# Patient Record
Sex: MALE | Race: WHITE | NOT HISPANIC OR LATINO | Employment: FULL TIME | URBAN - METROPOLITAN AREA
[De-identification: names, ages, dates, MRNs, and addresses within clinical notes are randomized per-mention and may not be internally consistent; named-entity substitution may affect disease eponyms.]

---

## 2018-01-11 NOTE — RESULT NOTES
Verified Results  * MRI SHOULDER RIGHT WO CONTRAST [id number] 07:09AM Aleyda Pa Order Number: [id number]   Performing Comments: CD copy to patient please   - Patient Instructions: To schedule this appointment, please contact Central Scheduling at [phone]  Test Name Result Flag Reference   MRI SHOULDER RIGHT WO CONTRAST (Report)     This is a summary report  The complete report is available in the patient's medical record  If you cannot access the medical record, please contact the sending organization for a detailed fax or copy  MRI RIGHT SHOULDER     INDICATION: Shoulder pain  COMPARISON: None  TECHNIQUE:  The following MR sequences were obtained of the right shoulder: Localizer, axial GRE/PD fat sat, oblique coronal T2 fat sat, oblique sagittal T1/T2 fat sat  Images were acquired on a 1 5 Rosario unit  Gadolinium was not used  FINDINGS:     SUBCUTANEOUS TISSUES: Normal     JOINT EFFUSION: None  ACROMION PROCESS: There is a moderate-sized subacromial spur  ROTATOR CUFF: Supraspinatus and infraspinatus insertional tendinosis without evidence of full-thickness component rotator cuff tear  The remaining muscles and tendons of the rotator cuff appear intact without muscle atrophy or fatty infiltration  SUBACROMIAL/SUBDELTOID BURSA: There is a small subacromial/subdeltoid bursitis  LONG HEAD OF BICEPS TENDON: Normal      GLENOID LABRUM: Intact  GLENOHUMERAL JOINT: Intact  ACROMIOCLAVICULAR JOINT: There is moderate osteoarthritis  BONE MARROW SIGNAL: There is subchondral edema with geodes at the rotator cuff attachment on the humeral head  IMPRESSION:       1  Rotator cuff tendinosis with no full-thickness tear  2  Moderate-sized subacromial osteophyte with acromioclavicular joint osteoarthritis         Workstation performed: [id number]     Signed by:   Yanet Braden MD   4/18/16       Discussion/Summary   MRI of the shoulder showed rotator cuff tendonitis and bursitis and arthritis    Dr Edgar Andrew

## 2021-04-08 DIAGNOSIS — Z23 ENCOUNTER FOR IMMUNIZATION: ICD-10-CM

## 2021-04-25 PROBLEM — Z12.11 ENCOUNTER FOR SCREENING FOR MALIGNANT NEOPLASM OF COLON: Status: ACTIVE | Noted: 2019-05-16

## 2021-04-29 ENCOUNTER — OFFICE VISIT (OUTPATIENT)
Dept: FAMILY MEDICINE CLINIC | Facility: CLINIC | Age: 61
End: 2021-04-29
Payer: COMMERCIAL

## 2021-04-29 VITALS
OXYGEN SATURATION: 99 % | WEIGHT: 172 LBS | HEIGHT: 71 IN | DIASTOLIC BLOOD PRESSURE: 70 MMHG | SYSTOLIC BLOOD PRESSURE: 114 MMHG | RESPIRATION RATE: 16 BRPM | TEMPERATURE: 97.4 F | BODY MASS INDEX: 24.08 KG/M2 | HEART RATE: 67 BPM

## 2021-04-29 DIAGNOSIS — Z86.010 PERSONAL HISTORY OF COLONIC POLYPS: ICD-10-CM

## 2021-04-29 DIAGNOSIS — Z12.5 PROSTATE CANCER SCREENING: ICD-10-CM

## 2021-04-29 DIAGNOSIS — Z00.00 HEALTHCARE MAINTENANCE: Primary | ICD-10-CM

## 2021-04-29 DIAGNOSIS — Z11.59 NEED FOR HEPATITIS C SCREENING TEST: ICD-10-CM

## 2021-04-29 DIAGNOSIS — Z12.11 COLON CANCER SCREENING: ICD-10-CM

## 2021-04-29 DIAGNOSIS — Z13.1 SCREENING FOR DIABETES MELLITUS (DM): ICD-10-CM

## 2021-04-29 DIAGNOSIS — Z13.89 SCREENING FOR CARDIOVASCULAR, RESPIRATORY, AND GENITOURINARY DISEASES: ICD-10-CM

## 2021-04-29 DIAGNOSIS — Z13.83 SCREENING FOR CARDIOVASCULAR, RESPIRATORY, AND GENITOURINARY DISEASES: ICD-10-CM

## 2021-04-29 DIAGNOSIS — M67.432 GANGLION CYST OF WRIST, LEFT: ICD-10-CM

## 2021-04-29 DIAGNOSIS — Z13.6 SCREENING FOR CARDIOVASCULAR, RESPIRATORY, AND GENITOURINARY DISEASES: ICD-10-CM

## 2021-04-29 PROBLEM — Z86.0100 PERSONAL HISTORY OF COLONIC POLYPS: Status: ACTIVE | Noted: 2021-04-29

## 2021-04-29 PROCEDURE — 3008F BODY MASS INDEX DOCD: CPT | Performed by: FAMILY MEDICINE

## 2021-04-29 PROCEDURE — 1036F TOBACCO NON-USER: CPT | Performed by: FAMILY MEDICINE

## 2021-04-29 PROCEDURE — 3725F SCREEN DEPRESSION PERFORMED: CPT | Performed by: FAMILY MEDICINE

## 2021-04-29 PROCEDURE — 99386 PREV VISIT NEW AGE 40-64: CPT | Performed by: FAMILY MEDICINE

## 2021-04-29 NOTE — PROGRESS NOTES
Assessment/Plan:    No problem-specific Assessment & Plan notes found for this encounter  cpe  Update labs  Yearly suggested if normal  Left ganglion cyst, refer to Dr Agustin Cadet     Diagnoses and all orders for this visit:    Healthcare maintenance    Personal history of colonic polyps  -     Ambulatory referral to Gastroenterology; Future    Screening for cardiovascular, respiratory, and genitourinary diseases  -     Lipid Panel with Direct LDL reflex; Future    Screening for diabetes mellitus (DM)  -     Comprehensive metabolic panel; Future    Prostate cancer screening  -     PSA, Total Screen; Future    Need for hepatitis C screening test    Colon cancer screening  -     Ambulatory referral to Gastroenterology; Future    Ganglion cyst of wrist, left  -     Ambulatory referral to Orthopedic Surgery; Future         Return if symptoms worsen or fail to improve  Subjective:      Patient ID: Lizabeth Hartman is a 63 y o  male  Chief Complaint   Patient presents with    Physical Exam     mz cma       HPI  Moved to [facility] for 2y then back to [facility] then [facility] now    Was seeing PCP in [address] [place] [address]  Over 1-2y  No rx meds    Walks a lot  Not much exercise lately, gym before pandemic  Diet healthy  Fruits/veggies  Can drink more better    Colonoscopy 3y ago, 3 polyps,     Had tetanus in past 10y    Had covid series    Last tob 20y    Enlarging past 1y, lexis past month  Left wrist cyst  No trigger finger    The following portions of the patient's history were reviewed and updated as appropriate: allergies, current medications, past family history, past medical history, past social history, past surgical history and problem list     Review of Systems   Constitutional: Negative for fever  Respiratory: Negative for shortness of breath  No current outpatient medications on file  No current facility-administered medications for this visit          Objective:    /70 Pulse 67   Temp (!) 97 4 °F (36 3 °C)   Resp 16   Ht 5' 11" (1 803 m)   Wt 78 kg (172 lb)   SpO2 99%   BMI 23 99 kg/m²        Physical Exam  Vitals signs and nursing note reviewed  Constitutional:       Appearance: He is well-developed  He is not ill-appearing  HENT:      Head: Normocephalic  Right Ear: Tympanic membrane normal       Left Ear: Tympanic membrane normal    Eyes:      General: No scleral icterus  Conjunctiva/sclera: Conjunctivae normal    Neck:      Musculoskeletal: Neck supple  Vascular: No carotid bruit  Cardiovascular:      Rate and Rhythm: Normal rate and regular rhythm  Heart sounds: No murmur  Pulmonary:      Effort: Pulmonary effort is normal  No respiratory distress  Breath sounds: No wheezing or rales  Abdominal:      General: There is no distension  Palpations: Abdomen is soft  Tenderness: There is no abdominal tenderness  Hernia: No hernia is present  Genitourinary:     Penis: Normal        Scrotum/Testes: Normal       Prostate: Normal       Rectum: Normal    Musculoskeletal:         General: No deformity  Right lower leg: No edema  Left lower leg: No edema  Skin:     General: Skin is warm and dry  Coloration: Skin is not jaundiced or pale  Neurological:      Mental Status: He is alert  Motor: No weakness  Gait: Gait normal    Psychiatric:         Mood and Affect: Mood normal          Behavior: Behavior normal          Thought Content: Thought content normal        BMI Counseling: Body mass index is 23 99 kg/m²  The BMI is above normal  Nutrition recommendations include decreasing portion sizes and moderation in carbohydrate intake  Exercise recommendations include exercising 3-5 times per week  No pharmacotherapy was ordered                  Arlene Stephens DO

## 2021-05-01 LAB
ALBUMIN SERPL-MCNC: 4.1 G/DL (ref 3.6–5.1)
ALBUMIN/GLOB SERPL: 1.7 (CALC) (ref 1–2.5)
ALP SERPL-CCNC: 67 U/L (ref 35–144)
ALT SERPL-CCNC: 13 U/L (ref 9–46)
AST SERPL-CCNC: 18 U/L (ref 10–35)
BILIRUB SERPL-MCNC: 0.5 MG/DL (ref 0.2–1.2)
BUN SERPL-MCNC: 15 MG/DL (ref 7–25)
BUN/CREAT SERPL: NORMAL (CALC) (ref 6–22)
CALCIUM SERPL-MCNC: 9.2 MG/DL (ref 8.6–10.3)
CHLORIDE SERPL-SCNC: 104 MMOL/L (ref 98–110)
CHOLEST SERPL-MCNC: 178 MG/DL
CHOLEST/HDLC SERPL: 2.1 (CALC)
CO2 SERPL-SCNC: 29 MMOL/L (ref 20–32)
CREAT SERPL-MCNC: 0.8 MG/DL (ref 0.7–1.25)
GLOBULIN SER CALC-MCNC: 2.4 G/DL (CALC) (ref 1.9–3.7)
GLUCOSE SERPL-MCNC: 91 MG/DL (ref 65–99)
HDLC SERPL-MCNC: 83 MG/DL
LDLC SERPL CALC-MCNC: 82 MG/DL (CALC)
NONHDLC SERPL-MCNC: 95 MG/DL (CALC)
POTASSIUM SERPL-SCNC: 4.7 MMOL/L (ref 3.5–5.3)
PROT SERPL-MCNC: 6.5 G/DL (ref 6.1–8.1)
PSA SERPL-MCNC: 0.9 NG/ML
SL AMB EGFR AFRICAN AMERICAN: 112 ML/MIN/1.73M2
SL AMB EGFR NON AFRICAN AMERICAN: 96 ML/MIN/1.73M2
SODIUM SERPL-SCNC: 140 MMOL/L (ref 135–146)
TRIGL SERPL-MCNC: 53 MG/DL

## 2021-05-17 ENCOUNTER — TELEPHONE (OUTPATIENT)
Dept: GASTROENTEROLOGY | Facility: CLINIC | Age: 61
End: 2021-05-17

## 2021-05-17 ENCOUNTER — PREP FOR PROCEDURE (OUTPATIENT)
Dept: GASTROENTEROLOGY | Facility: CLINIC | Age: 61
End: 2021-05-17

## 2021-05-17 DIAGNOSIS — Z86.010 HISTORY OF COLON POLYPS: Primary | ICD-10-CM

## 2021-05-17 NOTE — TELEPHONE ENCOUNTER
Received message from pt informing he was referred by his pcp to schedule colon screening, however, pt is overdue for recall colon for hx of polyps with Dr Veronica Mederos  I returned pt's call, lmom apologizing for missing him and to please call our office back to schedule a procedure with Dr Veronica Mederos  Will call pt again in one week  There is a referral in pt's chart from Dr Gloria Rios

## 2021-06-11 LAB — EXT SARS-COV-2: NOT DETECTED

## 2021-06-16 ENCOUNTER — ANESTHESIA EVENT (OUTPATIENT)
Dept: GASTROENTEROLOGY | Facility: AMBULATORY SURGERY CENTER | Age: 61
End: 2021-06-16

## 2021-06-16 ENCOUNTER — ANESTHESIA (OUTPATIENT)
Dept: GASTROENTEROLOGY | Facility: AMBULATORY SURGERY CENTER | Age: 61
End: 2021-06-16

## 2021-06-16 ENCOUNTER — HOSPITAL ENCOUNTER (OUTPATIENT)
Dept: GASTROENTEROLOGY | Facility: AMBULATORY SURGERY CENTER | Age: 61
Discharge: HOME | End: 2021-06-16
Payer: COMMERCIAL

## 2021-06-16 VITALS
HEIGHT: 72 IN | DIASTOLIC BLOOD PRESSURE: 75 MMHG | SYSTOLIC BLOOD PRESSURE: 101 MMHG | OXYGEN SATURATION: 99 % | RESPIRATION RATE: 18 BRPM | HEART RATE: 56 BPM | BODY MASS INDEX: 23.03 KG/M2 | TEMPERATURE: 97.2 F | WEIGHT: 170 LBS

## 2021-06-16 DIAGNOSIS — Z86.010 HISTORY OF COLON POLYPS: ICD-10-CM

## 2021-06-16 PROCEDURE — 88305 TISSUE EXAM BY PATHOLOGIST: CPT | Performed by: PATHOLOGY

## 2021-06-16 PROCEDURE — 45380 COLONOSCOPY AND BIOPSY: CPT | Performed by: INTERNAL MEDICINE

## 2021-06-16 PROCEDURE — 00811 ANES LWR INTST NDSC NOS: CPT | Performed by: NURSE ANESTHETIST, CERTIFIED REGISTERED

## 2021-06-16 RX ORDER — SODIUM CHLORIDE 9 MG/ML
20 INJECTION, SOLUTION INTRAVENOUS CONTINUOUS
Status: DISCONTINUED | OUTPATIENT
Start: 2021-06-16 | End: 2021-06-20 | Stop reason: HOSPADM

## 2021-06-16 RX ORDER — PROPOFOL 10 MG/ML
INJECTION, EMULSION INTRAVENOUS AS NEEDED
Status: DISCONTINUED | OUTPATIENT
Start: 2021-06-16 | End: 2021-06-16

## 2021-06-16 RX ORDER — SODIUM CHLORIDE 9 MG/ML
INJECTION, SOLUTION INTRAVENOUS CONTINUOUS PRN
Status: DISCONTINUED | OUTPATIENT
Start: 2021-06-16 | End: 2021-06-16

## 2021-06-16 RX ORDER — SODIUM CHLORIDE 9 MG/ML
30 INJECTION, SOLUTION INTRAVENOUS CONTINUOUS
Status: DISCONTINUED | OUTPATIENT
Start: 2021-06-16 | End: 2021-06-20 | Stop reason: HOSPADM

## 2021-06-16 RX ORDER — SODIUM CHLORIDE 9 MG/ML
50 INJECTION, SOLUTION INTRAVENOUS CONTINUOUS
Status: DISCONTINUED | OUTPATIENT
Start: 2021-06-16 | End: 2021-06-20 | Stop reason: HOSPADM

## 2021-06-16 RX ADMIN — PROPOFOL 20 MG: 10 INJECTION, EMULSION INTRAVENOUS at 10:56

## 2021-06-16 RX ADMIN — PROPOFOL 20 MG: 10 INJECTION, EMULSION INTRAVENOUS at 11:04

## 2021-06-16 RX ADMIN — SODIUM CHLORIDE: 9 INJECTION, SOLUTION INTRAVENOUS at 10:50

## 2021-06-16 RX ADMIN — PROPOFOL 20 MG: 10 INJECTION, EMULSION INTRAVENOUS at 11:01

## 2021-06-16 RX ADMIN — PROPOFOL 30 MG: 10 INJECTION, EMULSION INTRAVENOUS at 10:54

## 2021-06-16 RX ADMIN — PROPOFOL 100 MG: 10 INJECTION, EMULSION INTRAVENOUS at 10:52

## 2021-06-16 RX ADMIN — PROPOFOL 20 MG: 10 INJECTION, EMULSION INTRAVENOUS at 10:58

## 2021-06-16 RX ADMIN — PROPOFOL 20 MG: 10 INJECTION, EMULSION INTRAVENOUS at 11:07

## 2021-06-16 RX ADMIN — PROPOFOL 20 MG: 10 INJECTION, EMULSION INTRAVENOUS at 11:10

## 2021-06-16 NOTE — ANESTHESIA POSTPROCEDURE EVALUATION
Post-Op Assessment Note    CV Status:  Stable  Pain Score: 0    Pain management: adequate     Mental Status:  Alert and awake   Hydration Status:  Euvolemic   PONV Controlled:  Controlled   Airway Patency:  Patent      Post Op Vitals Reviewed: Yes      Staff: CRNA         No complications documented      BP   103/66   Temp     Pulse 54   Resp   18   SpO2   99%

## 2021-06-16 NOTE — DISCHARGE INSTRUCTIONS
Colonoscopy   WHAT YOU NEED TO KNOW:   A colonoscopy is a procedure to examine the inside of your colon (intestine) with a scope  Polyps or tissue growths may have been removed during your colonoscopy  It is normal to feel bloated and to have some abdominal discomfort  You should be passing gas  If you have hemorrhoids or you had polyps removed, you may have a small amount of bleeding  DISCHARGE INSTRUCTIONS:   Seek care immediately if:    You have sudden, severe abdominal pain   You have problems swallowing   You have a large amount of black, sticky bowel movements or blood in your bowel movements   You have sudden trouble breathing   You feel weak, lightheaded, or faint or your heart beats faster than normal for you  Contact your healthcare provider if:    You have a fever and chills   You have nausea or are vomiting   Your abdomen is bloated or feels full and hard   You have abdominal pain   You have black, sticky bowel movements or blood in your bowel movements   You have not had a bowel movement for 3 days after your procedure   You have rash or hives   You have questions or concerns about your procedure  Activity:    Do not lift, strain, or run for 24 hours after your procedure   Rest after your procedure  You have been given medicine to relax you  Do not drive or make important decisions until the day after your procedure  Return to your normal activity as directed   Relieve gas and discomfort from bloating by lying on your right side with a heating pad on your abdomen  You may need to take short walks to help the gas move out  Eat small meals until bloating is relieved  Follow up with your healthcare provider as directed: Write down your questions so you remember to ask them during your visits  If you take a blood thinner, please review the specific instructions from your endoscopist about when you should resume it   These can be found in the Recommendation and Your Medication list sections of this After Visit Summary  High Fiber Diet   WHAT YOU NEED TO KNOW:   What is a high-fiber diet? A high-fiber diet includes foods that have a high amount of fiber  Fiber is the part of fruits, vegetables, and grains that is not broken down by your body  Fiber keeps your bowel movements regular  Fiber can also help lower your cholesterol level, control blood sugar in people with diabetes, and relieve constipation  Fiber can also help you control your weight because it helps you feel full faster  Most adults should eat 25 to 35 grams of fiber each day  Talk to your dietitian or healthcare provider about the amount of fiber you need  What foods are good sources of fiber? · Foods with at least 4 grams of fiber per serving:      ? ? to ½ cup of high-fiber cereal (check the nutrition label on the box)    ? ½ cup of blackberries or raspberries    ? 4 dried prunes    ? 1 cooked artichoke    ? ½ cup of cooked legumes, such as lentils, or red, kidney, and de la rosa beans    · Foods with 1 to 3 grams of fiber per serving:      ? 1 slice of whole-wheat, pumpernickel, or rye bread    ? ½ cup of cooked brown rice    ? 4 whole-wheat crackers    ? 1 cup of oatmeal    ? ½ cup of cereal with 1 to 3 grams of fiber per serving (check the nutrition label on the box)    ? 1 small piece of fruit, such as an apple, banana, pear, kiwi, or orange    ? 3 dates    ? ½ cup of canned apricots, fruit cocktail, peaches, or pears    ? ½ cup of raw or cooked vegetables, such as carrots, cauliflower, cabbage, spinach, squash, or corn  What are some ways that I can increase fiber in my diet? · Choose brown or wild rice instead of white rice  · Use whole wheat flour in recipes instead of white or all-purpose flour  · Add beans and peas to casseroles or soups  · Choose fresh fruit and vegetables with peels or skins on instead of juices      What other guidelines should I follow? · Add fiber to your diet slowly  You may have abdominal discomfort, bloating, and gas if you add fiber to your diet too quickly  · Drink plenty of liquids as you add fiber to your diet  You may have nausea or develop constipation if you do not drink enough water  Ask how much liquid to drink each day and which liquids are best for you  CARE AGREEMENT:   You have the right to help plan your care  Discuss treatment options with your healthcare provider to decide what care you want to receive  You always have the right to refuse treatment  The above information is an  only  It is not intended as medical advice for individual conditions or treatments  Talk to your doctor, nurse or pharmacist before following any medical regimen to see if it is safe and effective for you  © Copyright [address] Information is for End User's use only and may not be sold, redistributed or otherwise used for commercial purposes  All illustrations and images included in CareNotes® are the copyrighted property of A D A M , Inc  or [address] [place], [place] [address]   Hemorrhoids   WHAT YOU NEED TO KNOW:   What are hemorrhoids? Hemorrhoids are swollen blood vessels inside your rectum (internal hemorrhoids) or on your anus (external hemorrhoids)  Sometimes a hemorrhoid may prolapse  This means it extends out of your anus  What increases my risk for hemorrhoids? · Pregnancy or obesity    · Straining or sitting for a long time during bowel movements    · Liver disease    · Weak muscles around the anus caused by older age, rectal surgery, or anal intercourse    · A lack of physical activity    · Chronic diarrhea or constipation    · A low-fiber diet    What are the signs and symptoms of hemorrhoids?    · Pain or itching around your anus or inside your rectum    · Swelling or bumps around your anus    · Bright red blood in your bowel movement, on the toilet paper, or in the toilet bowl    · Tissue bulging out of your anus (prolapsed hemorrhoids)    · Incontinence (poor control over urine or bowel movements)    How are hemorrhoids diagnosed? Your healthcare provider will ask about your symptoms, the foods you eat, and your bowel movements  He or she will examine your anus for external hemorrhoids  You may need the following:  · A digital rectal exam  is a test to check for hemorrhoids  Your healthcare provider will put a gloved finger inside your anus to feel for the hemorrhoids  · An anoscopy  is a test that uses a scope (small tube with a light and camera on the end) to look at your hemorrhoids  How are hemorrhoids treated? Treatment will depend on your symptoms  You may need any of the following:  · Medicines  can help decrease pain and swelling, and soften your bowel movement  The medicine may be a pill, pad, cream, or ointment  · Procedures  may be used to shrink or remove your hemorrhoid  Examples include rubber-band ligation, sclerotherapy, and photocoagulation  These procedures may be done in your healthcare provider's office  Ask your healthcare provider for more information about these procedures  · Surgery  may be needed to shrink or remove your hemorrhoids  How can I manage my symptoms? · Apply ice on your anus for 15 to 20 minutes every hour or as directed  Use an ice pack, or put crushed ice in a plastic bag  Cover it with a towel before you apply it to your anus  Ice helps prevent tissue damage and decreases swelling and pain  · Take a sitz bath  Fill a bathtub with 4 to 6 inches of warm water  You may also use a sitz bath pan that fits inside a toilet bowl  Sit in the sitz bath for 15 minutes  Do this 3 times a day, and after each bowel movement  The warm water can help decrease pain and swelling  · Keep your anal area clean  Gently wash the area with warm water daily  Soap may irritate the area  After a bowel movement, wipe with moist towelettes or wet toilet paper   Dry toilet paper can irritate the area  How can I help prevent hemorrhoids? · Do not strain to have a bowel movement  Do not sit on the toilet too long  These actions can increase pressure on the tissues in your rectum and anus  · Drink plenty of liquids  Liquids can help prevent constipation  Ask how much liquid to drink each day and which liquids are best for you  · Eat a variety of high-fiber foods  Examples include fruits, vegetables, and whole grains  Ask your healthcare provider how much fiber you need each day  You may need to take a fiber supplement  · Exercise as directed  Exercise, such as walking, may make it easier to have a bowel movement  Ask your healthcare provider to help you create an exercise plan  · Do not have anal sex  Anal sex can weaken the skin around your rectum and anus  · Avoid heavy lifting  This can cause straining and increase your risk for another hemorrhoid  When should I seek immediate care? · You have severe pain in your rectum or around your anus  · You have severe pain in your abdomen and you are vomiting  · You have bleeding from your anus that soaks through your underwear  When should I contact my healthcare provider? · You have frequent and painful bowel movements  · Your hemorrhoid looks or feels more swollen than usual      · You do not have a bowel movement for 2 days or more  · You see or feel tissue coming through your anus  · You have questions or concerns about your condition or care  CARE AGREEMENT:   You have the right to help plan your care  Learn about your health condition and how it may be treated  Discuss treatment options with your healthcare providers to decide what care you want to receive  You always have the right to refuse treatment  The above information is an  only  It is not intended as medical advice for individual conditions or treatments   Talk to your doctor, nurse or pharmacist before following any medical regimen to see if it is safe and effective for you  © Copyright [address] Information is for End User's use only and may not be sold, redistributed or otherwise used for commercial purposes  All illustrations and images included in CareNotes® are the copyrighted property of A D A M , Inc  or Eulalio Tamez   Colorectal Polyps   WHAT YOU NEED TO KNOW:   Colorectal polyps are small growths of tissue in the lining of the colon and rectum  Most polyps are hyperplastic polyps and are usually benign (noncancerous)  Certain types of polyps, called adenomatous polyps, may turn into cancer  DISCHARGE INSTRUCTIONS:   Follow up with your healthcare provider or gastroenterologist as directed: You may need to return for more tests, such as another colonoscopy  Write down your questions so you remember to ask them during your visits  Reduce your risk for colorectal polyps:   · Eat a variety of healthy foods:  Healthy foods include fruit, vegetables, whole-grain breads, low-fat dairy products, beans, lean meat, and fish  Ask if you need to be on a special diet  · Maintain a healthy weight:  Ask your healthcare provider if you need to lose weight and how much you need to lose  Ask for help with a weight loss program     · Exercise:  Begin to exercise slowly and do more as you get stronger  Talk with your healthcare provider before you start an exercise program      · Limit alcohol:  Your risk for polyps increases the more you drink  · Do not smoke: If you smoke, it is never too late to quit  Ask for information about how to stop  For support and more information:   · Rashel Rivas [facility]) [address], [place] [address]  Phone: 3- 847 - 260-9441  Web Address: www digestive  niddk nih gov    Contact your healthcare provider or gastroenterologist if:   · You have a fever       · You have chills, a cough, or feel weak and achy  · You have abdominal pain that does not go away or gets worse after you take medicine  · Your abdomen is swollen  · You are losing weight without trying  · You have questions or concerns about your condition or care  Seek care immediately or call 911 if:   · You have sudden shortness of breath  · You have a fast heart rate, fast breathing, or are too dizzy to stand up  · You have severe abdominal pain  · You see blood in your bowel movement  © Copyright [address] Information is for End User's use only and may not be sold, redistributed or otherwise used for commercial purposes  All illustrations and images included in CareNotes® are the copyrighted property of A D A M , Inc  or [address] [place] [address]  The above information is an  only  It is not intended as medical advice for individual conditions or treatments  Talk to your doctor, nurse or pharmacist before following any medical regimen to see if it is safe and effective for you

## 2021-06-16 NOTE — ANESTHESIA PREPROCEDURE EVALUATION
Procedure:  COLONOSCOPY    Relevant Problems   NEURO/PSYCH   (+) Personal history of colonic polyps        Physical Exam    Airway    Mallampati score: II  TM Distance: >3 FB  Neck ROM: full     Dental       Cardiovascular  Rhythm: regular, Rate: normal,     Pulmonary  Breath sounds clear to auscultation,     Other Findings        Anesthesia Plan  ASA Score- 2     Anesthesia Type- IV sedation with anesthesia with ASA Monitors  Additional Monitors:   Airway Plan:           Plan Factors-Exercise tolerance (METS): >4 METS  Chart reviewed  Patient summary reviewed  Patient is not a current smoker  Induction-     Postoperative Plan-     Informed Consent- Anesthetic plan and risks discussed with patient

## 2021-06-16 NOTE — H&P
History and Physical - [facility]  Vashti Williamson 63 y o  male MRN: [id number]                  HPI: Vashti Williamson is a 63y o  year old male who presents for colonoscopy due to history of colon polyps  Last colonoscopy 3 years ago by history      REVIEW OF SYSTEMS: Per the HPI, and otherwise unremarkable  Historical Information   Past Medical History:   Diagnosis Date    Colon polyp      Past Surgical History:   Procedure Laterality Date    KNEE SURGERY Right      Social History   Social History     Substance and Sexual Activity   Alcohol Use Yes    Comment: occ     Social History     Substance and Sexual Activity   Drug Use Never     Social History     Tobacco Use   Smoking Status Former Smoker    Quit date: 1981    Years since quittin 1   Smokeless Tobacco Never Used     History reviewed  No pertinent family history  Meds/Allergies     No current outpatient medications on file  Current Facility-Administered Medications:     sodium chloride 0 9 % infusion, 30 mL/hr, Intravenous, Continuous    sodium chloride 0 9 % infusion, 20 mL/hr, Intravenous, Continuous    No Known Allergies    Objective     /81   Pulse 61   Temp (!) 97 2 °F (36 2 °C) (Temporal)   Resp 18   Ht 6' (1 829 m)   Wt 77 1 kg (170 lb)   SpO2 99%   BMI 23 06 kg/m²       PHYSICAL EXAM    Gen: NAD  Head: NCAT  CV: RRR  CHEST: Clear  ABD: soft, NT/ND  EXT: no edema      ASSESSMENT/PLAN:  This is a 63y o  year old male here for colonoscopy, and he is stable and optimized for his procedure

## 2022-03-03 ENCOUNTER — TELEPHONE (OUTPATIENT)
Dept: FAMILY MEDICINE CLINIC | Facility: CLINIC | Age: 62
End: 2022-03-03

## 2022-03-03 NOTE — TELEPHONE ENCOUNTER
[facility] requesting a call back regarding cancelled appt  Please schedule appt    Donna Venegas MA

## 2022-04-27 ENCOUNTER — OFFICE VISIT (OUTPATIENT)
Dept: FAMILY MEDICINE CLINIC | Facility: CLINIC | Age: 62
End: 2022-04-27
Payer: COMMERCIAL

## 2022-04-27 VITALS
SYSTOLIC BLOOD PRESSURE: 100 MMHG | DIASTOLIC BLOOD PRESSURE: 60 MMHG | BODY MASS INDEX: 24.92 KG/M2 | TEMPERATURE: 97.5 F | WEIGHT: 184 LBS | RESPIRATION RATE: 16 BRPM | HEIGHT: 72 IN | HEART RATE: 67 BPM | OXYGEN SATURATION: 98 %

## 2022-04-27 DIAGNOSIS — Z13.89 SCREENING FOR CARDIOVASCULAR, RESPIRATORY, AND GENITOURINARY DISEASES: ICD-10-CM

## 2022-04-27 DIAGNOSIS — Z13.83 SCREENING FOR CARDIOVASCULAR, RESPIRATORY, AND GENITOURINARY DISEASES: ICD-10-CM

## 2022-04-27 DIAGNOSIS — Z12.5 PROSTATE CANCER SCREENING: ICD-10-CM

## 2022-04-27 DIAGNOSIS — Z13.1 SCREENING FOR DIABETES MELLITUS (DM): ICD-10-CM

## 2022-04-27 DIAGNOSIS — Z13.6 SCREENING FOR CARDIOVASCULAR, RESPIRATORY, AND GENITOURINARY DISEASES: ICD-10-CM

## 2022-04-27 PROBLEM — H04.551 OBSTRUCTION OF RIGHT TEAR DUCT: Status: ACTIVE | Noted: 2022-04-27

## 2022-04-27 PROCEDURE — 1036F TOBACCO NON-USER: CPT | Performed by: FAMILY MEDICINE

## 2022-04-27 PROCEDURE — 99213 OFFICE O/P EST LOW 20 MIN: CPT | Performed by: FAMILY MEDICINE

## 2022-04-27 PROCEDURE — 3008F BODY MASS INDEX DOCD: CPT | Performed by: FAMILY MEDICINE

## 2022-04-27 PROCEDURE — 3725F SCREEN DEPRESSION PERFORMED: CPT | Performed by: FAMILY MEDICINE

## 2022-04-27 RX ORDER — FLUTICASONE PROPIONATE 50 MCG
2 SPRAY, SUSPENSION (ML) NASAL DAILY
Qty: 48 G | Refills: 1 | Status: SHIPPED | OUTPATIENT
Start: 2022-04-27 | End: 2022-07-26

## 2022-04-27 RX ORDER — OLOPATADINE HYDROCHLORIDE 1 MG/ML
1 SOLUTION/ DROPS OPHTHALMIC 2 TIMES DAILY
Qty: 5 ML | Refills: 5 | Status: SHIPPED | OUTPATIENT
Start: 2022-04-27 | End: 2022-07-26

## 2022-04-27 NOTE — PROGRESS NOTES
Assessment/Plan:    No problem-specific Assessment & Plan notes found for this encounter  Right eye tearing w/o sign of infection or fb  Suspect nasolacrimal duct obstruction  Trial of INS  If no better try patanol  ophtho if persist    F/u after labs for cpe     Diagnoses and all orders for this visit:    Nasolacrimal duct obstruction, , right  -     fluticasone (FLONASE) 50 mcg/act nasal spray; 2 sprays into each nostril daily  -     olopatadine (PATANOL) 0 1 % ophthalmic solution; Administer 1 drop to the right eye 2 (two) times a day    Screening for cardiovascular, respiratory, and genitourinary diseases  -     Lipid Panel with Direct LDL reflex; Future    Screening for diabetes mellitus (DM)  -     Comprehensive metabolic panel; Future    Prostate cancer screening  -     PSA, Total Screen; Future        Return for Annual physical     Subjective:      Patient ID: Miya Adams is a 57 y o  male  Chief Complaint   Patient presents with    constant watery eyes     wmcma       HPI    Eating healthy  Some wt gain    Right eye tearing for 2w  All day  Not itchy, or irritated  Left eye ok  No hx of allergies  Vision ok  No eye exposures  Tried warm compresses  Some lubricating eye drops  Clear tears  No other sx or fever    The following portions of the patient's history were reviewed and updated as appropriate: allergies, current medications, past family history, past medical history, past social history, past surgical history and problem list     Review of Systems   Constitutional: Negative for chills and fever  Eyes: Negative for pain, redness, itching and visual disturbance           Current Outpatient Medications   Medication Sig Dispense Refill    fluticasone (FLONASE) 50 mcg/act nasal spray 2 sprays into each nostril daily 48 g 1    olopatadine (PATANOL) 0 1 % ophthalmic solution Administer 1 drop to the right eye 2 (two) times a day 5 mL 5     No current facility-administered medications for this visit  Objective:    /60   Pulse 67   Temp 97 5 °F (36 4 °C)   Resp 16   Ht 6' (1 829 m)   Wt 83 5 kg (184 lb)   SpO2 98%   BMI 24 95 kg/m²        Physical Exam  Vitals and nursing note reviewed  Constitutional:       General: He is not in acute distress  Appearance: He is well-developed  He is not ill-appearing  HENT:      Head: Normocephalic  Right Ear: Tympanic membrane and ear canal normal       Left Ear: Tympanic membrane and ear canal normal       Mouth/Throat:      Pharynx: No oropharyngeal exudate  Eyes:      General: No scleral icterus  Right eye: No discharge  Left eye: No discharge  Conjunctiva/sclera: Conjunctivae normal       Comments: Right eye clear tearing  No fb or injection   Cardiovascular:      Rate and Rhythm: Normal rate  Heart sounds: No murmur heard  Pulmonary:      Effort: Pulmonary effort is normal  No respiratory distress  Breath sounds: No wheezing  Abdominal:      Palpations: Abdomen is soft  Musculoskeletal:         General: No deformity  Cervical back: Neck supple  Right lower leg: No edema  Left lower leg: No edema  Skin:     General: Skin is warm and dry  Coloration: Skin is not pale  Neurological:      Mental Status: He is alert  Psychiatric:         Behavior: Behavior normal          Thought Content:  Thought content normal                 Naoma Allen, DO

## 2022-06-15 LAB
ALBUMIN SERPL-MCNC: 4.4 G/DL (ref 3.8–4.8)
ALBUMIN/GLOB SERPL: 1.9 {RATIO} (ref 1.2–2.2)
ALP SERPL-CCNC: 64 IU/L (ref 44–121)
ALT SERPL-CCNC: 12 IU/L (ref 0–44)
AST SERPL-CCNC: 20 IU/L (ref 0–40)
BILIRUB SERPL-MCNC: 0.6 MG/DL (ref 0–1.2)
BUN SERPL-MCNC: 15 MG/DL (ref 8–27)
BUN/CREAT SERPL: 17 (ref 10–24)
CALCIUM SERPL-MCNC: 9.5 MG/DL (ref 8.6–10.2)
CHLORIDE SERPL-SCNC: 100 MMOL/L (ref 96–106)
CHOLEST SERPL-MCNC: 190 MG/DL (ref 100–199)
CO2 SERPL-SCNC: 25 MMOL/L (ref 20–29)
CREAT SERPL-MCNC: 0.87 MG/DL (ref 0.76–1.27)
EGFR: 98 ML/MIN/1.73
GLOBULIN SER-MCNC: 2.3 G/DL (ref 1.5–4.5)
GLUCOSE SERPL-MCNC: 93 MG/DL (ref 65–99)
HDLC SERPL-MCNC: 73 MG/DL
LDLC SERPL CALC-MCNC: 106 MG/DL (ref 0–99)
MICRODELETION SYND BLD/T FISH: NORMAL
POTASSIUM SERPL-SCNC: 4.3 MMOL/L (ref 3.5–5.2)
PROT SERPL-MCNC: 6.7 G/DL (ref 6–8.5)
PSA SERPL-MCNC: 1.4 NG/ML (ref 0–4)
SODIUM SERPL-SCNC: 139 MMOL/L (ref 134–144)
TRIGL SERPL-MCNC: 58 MG/DL (ref 0–149)

## 2022-06-21 ENCOUNTER — OFFICE VISIT (OUTPATIENT)
Dept: FAMILY MEDICINE CLINIC | Facility: CLINIC | Age: 62
End: 2022-06-21
Payer: COMMERCIAL

## 2022-06-21 VITALS
HEART RATE: 76 BPM | OXYGEN SATURATION: 97 % | DIASTOLIC BLOOD PRESSURE: 76 MMHG | TEMPERATURE: 97.8 F | WEIGHT: 182.6 LBS | BODY MASS INDEX: 25.56 KG/M2 | HEIGHT: 71 IN | RESPIRATION RATE: 18 BRPM | SYSTOLIC BLOOD PRESSURE: 120 MMHG

## 2022-06-21 DIAGNOSIS — G43.109 OCULAR MIGRAINE: ICD-10-CM

## 2022-06-21 DIAGNOSIS — Z23 IMMUNIZATION DUE: ICD-10-CM

## 2022-06-21 DIAGNOSIS — Z86.010 PERSONAL HISTORY OF COLONIC POLYPS: ICD-10-CM

## 2022-06-21 DIAGNOSIS — Z00.00 HEALTHCARE MAINTENANCE: Primary | ICD-10-CM

## 2022-06-21 DIAGNOSIS — N52.9 ORGANIC IMPOTENCE: ICD-10-CM

## 2022-06-21 PROCEDURE — 1036F TOBACCO NON-USER: CPT | Performed by: FAMILY MEDICINE

## 2022-06-21 PROCEDURE — 99396 PREV VISIT EST AGE 40-64: CPT | Performed by: FAMILY MEDICINE

## 2022-06-21 PROCEDURE — 3725F SCREEN DEPRESSION PERFORMED: CPT | Performed by: FAMILY MEDICINE

## 2022-06-21 PROCEDURE — 3008F BODY MASS INDEX DOCD: CPT | Performed by: FAMILY MEDICINE

## 2022-06-21 PROCEDURE — 90715 TDAP VACCINE 7 YRS/> IM: CPT

## 2022-06-21 PROCEDURE — 90471 IMMUNIZATION ADMIN: CPT

## 2022-06-21 RX ORDER — SILDENAFIL 100 MG/1
100 TABLET, FILM COATED ORAL DAILY PRN
Qty: 30 TABLET | Refills: 5 | Status: SHIPPED | OUTPATIENT
Start: 2022-06-21

## 2022-06-21 NOTE — PROGRESS NOTES
Assessment/Plan:    No problem-specific Assessment & Plan notes found for this encounter  cpe    Labs reviewed  Adacel, no grandkids  Suggest ophtho for ocular migraines, can have severe episodes    Exercise/stretch for back and right hip suggested, possible tendonitis    ED, viagra use discussed, rx sent     Diagnoses and all orders for this visit:    Healthcare maintenance    Organic impotence  -     sildenafil (VIAGRA) 100 mg tablet; Take 1 tablet (100 mg total) by mouth daily as needed for erectile dysfunction    Immunization due  -     TDAP VACCINE GREATER THAN OR EQUAL TO 6YO IM    Ocular migraine    Personal history of colonic polyps        Had shingrix shots x 2 in past    Right hip pain at times, usually in am, with standing but short lived, better with rest but also better with activity    Right lumbar area soreness, lasted few weeks, now better, can be related to sleep position, does get better when changes position, no urine flow issues    Eye doctor  Ocular migraines per pt  Saw OD at the time  Gets infrequently  Some vision changes, one time severe enough to need to pull over, can last up to 30 minutes    Return if symptoms worsen or fail to improve  Subjective:      Patient ID: Erinn Alston is a 57 y o  male      Chief Complaint   Patient presents with    Physical Exam     Rocio Massey         HPI  Did eat badly for 1m before labs due to guests from [address] [place] [address] wt too  Usually good with chicken/fish/veggies  15# gain after retired 3m ago  Starting at gym again    Some ED, more lately, not work stress this time    Had shingrix shots x 2 in past    Right hip pain at times, usually in am, with standing but short lived, better with rest but also better with activity    Right lumbar area soreness, lasted few weeks, now better, can be related to sleep position, does get better when changes position, no urine flow issues    Eye doctor  Ocular migraines per pt  Saw OD at the time  Gets infrequently  Some vision changes, one time severe enough to need to pull over, can last up to 30 minutes    The following portions of the patient's history were reviewed and updated as appropriate: allergies, current medications, past family history, past medical history, past social history, past surgical history and problem list     Review of Systems   Genitourinary: Negative for difficulty urinating  Musculoskeletal: Positive for arthralgias and back pain  Current Outpatient Medications   Medication Sig Dispense Refill    sildenafil (VIAGRA) 100 mg tablet Take 1 tablet (100 mg total) by mouth daily as needed for erectile dysfunction 30 tablet 5    fluticasone (FLONASE) 50 mcg/act nasal spray 2 sprays into each nostril daily (Patient not taking: Reported on 6/21/2022) 48 g 1    olopatadine (PATANOL) 0 1 % ophthalmic solution Administer 1 drop to the right eye 2 (two) times a day (Patient not taking: Reported on 6/21/2022) 5 mL 5     No current facility-administered medications for this visit  Objective:    /76   Pulse 76   Temp 97 8 °F (36 6 °C)   Resp 18   Ht 5' 11" (1 803 m)   Wt 82 8 kg (182 lb 9 6 oz)   SpO2 97%   BMI 25 47 kg/m²        Physical Exam  Vitals and nursing note reviewed  Constitutional:       General: He is not in acute distress  Appearance: He is well-developed  He is not ill-appearing  HENT:      Head: Normocephalic  Right Ear: Tympanic membrane normal       Left Ear: Tympanic membrane normal    Eyes:      General: No scleral icterus  Conjunctiva/sclera: Conjunctivae normal    Neck:      Vascular: No carotid bruit  Cardiovascular:      Rate and Rhythm: Normal rate and regular rhythm  Heart sounds: No murmur heard  Pulmonary:      Effort: Pulmonary effort is normal  No respiratory distress  Abdominal:      General: There is no distension  Palpations: Abdomen is soft  Tenderness: There is no abdominal tenderness        Hernia: No hernia is present  Genitourinary:     Penis: Normal        Testes: Normal       Prostate: Normal       Rectum: Normal    Musculoskeletal:         General: No deformity  Cervical back: Neck supple  Right lower leg: No edema  Left lower leg: No edema  Comments: SLR neg b/l  Tamsen Brink test neg on right   Skin:     General: Skin is warm and dry  Coloration: Skin is not pale  Neurological:      Mental Status: He is alert  Motor: No weakness  Gait: Gait normal    Psychiatric:         Behavior: Behavior normal          Thought Content: Thought content normal          BMI Counseling: Body mass index is 25 47 kg/m²  The BMI is above normal  Nutrition recommendations include decreasing portion sizes and moderation in carbohydrate intake  Exercise recommendations include exercising 3-5 times per week  No pharmacotherapy was ordered  Rationale for BMI follow-up plan is due to patient being overweight or obese  Depression Screening and Follow-up Plan: Patient was screened for depression during today's encounter  They screened negative with a PHQ-2 score of 0             Efren Mary DO

## 2022-07-26 ENCOUNTER — OFFICE VISIT (OUTPATIENT)
Dept: FAMILY MEDICINE CLINIC | Facility: CLINIC | Age: 62
End: 2022-07-26
Payer: COMMERCIAL

## 2022-07-26 VITALS
BODY MASS INDEX: 25.9 KG/M2 | TEMPERATURE: 97.3 F | RESPIRATION RATE: 17 BRPM | HEART RATE: 67 BPM | OXYGEN SATURATION: 100 % | DIASTOLIC BLOOD PRESSURE: 72 MMHG | WEIGHT: 185 LBS | HEIGHT: 71 IN | SYSTOLIC BLOOD PRESSURE: 124 MMHG

## 2022-07-26 DIAGNOSIS — S39.012A STRAIN OF LUMBAR REGION, INITIAL ENCOUNTER: Primary | ICD-10-CM

## 2022-07-26 PROCEDURE — 99213 OFFICE O/P EST LOW 20 MIN: CPT | Performed by: FAMILY MEDICINE

## 2022-07-26 RX ORDER — CYCLOBENZAPRINE HCL 10 MG
10 TABLET ORAL
Qty: 30 TABLET | Refills: 0 | Status: SHIPPED | OUTPATIENT
Start: 2022-07-26

## 2022-07-26 RX ORDER — METHYLPREDNISOLONE 4 MG/1
TABLET ORAL
Qty: 21 TABLET | Refills: 0 | Status: SHIPPED | OUTPATIENT
Start: 2022-07-26 | End: 2022-08-01

## 2022-07-26 NOTE — PROGRESS NOTES
Assessment/Plan:    No problem-specific Assessment & Plan notes found for this encounter  Back strain  Steroid risks advised  Gradual return to gym  PT if no better     Diagnoses and all orders for this visit:    Strain of lumbar region, initial encounter  -     cyclobenzaprine (FLEXERIL) 10 mg tablet; Take 1 tablet (10 mg total) by mouth daily at bedtime as needed for muscle spasms  -     methylPREDNISolone 4 MG tablet therapy pack; Use as directed on package          Return if symptoms worsen or fail to improve  Subjective:      Patient ID: Vashti Williamson is a 57 y o  male  Chief Complaint   Patient presents with    Back Pain     Lower right back pain, has been going to the gym recently and pulled a muscle nm  lpn       HPI  Getting back to gym  3x/w  Doing resisted crunches on machine  Hx of back pain   Started to feel then but worse 1hr later  Dull  Constant  Couldn't get comfortable  Worse right lateral   Better when he stands  No blood in urine or hx of kidney stone  Took some advil  400mg at onset then more later and helped  2nd day  No radiation down legs  No incontnence    The following portions of the patient's history were reviewed and updated as appropriate: allergies, current medications, past family history, past medical history, past social history, past surgical history and problem list     Review of Systems   Genitourinary: Negative for difficulty urinating and hematuria  Current Outpatient Medications   Medication Sig Dispense Refill    cyclobenzaprine (FLEXERIL) 10 mg tablet Take 1 tablet (10 mg total) by mouth daily at bedtime as needed for muscle spasms 30 tablet 0    methylPREDNISolone 4 MG tablet therapy pack Use as directed on package 21 tablet 0    sildenafil (VIAGRA) 100 mg tablet Take 1 tablet (100 mg total) by mouth daily as needed for erectile dysfunction 30 tablet 5     No current facility-administered medications for this visit         Objective:    /72 Pulse 67   Temp (!) 97 3 °F (36 3 °C)   Resp 17   Ht 5' 11" (1 803 m)   Wt 83 9 kg (185 lb)   SpO2 100%   BMI 25 80 kg/m²        Physical Exam  Vitals and nursing note reviewed  Constitutional:       General: He is not in acute distress  Appearance: He is well-developed  He is not ill-appearing  HENT:      Head: Normocephalic  Eyes:      Conjunctiva/sclera: Conjunctivae normal    Cardiovascular:      Rate and Rhythm: Normal rate and regular rhythm  Heart sounds: No murmur heard  Pulmonary:      Effort: Pulmonary effort is normal  No respiratory distress  Breath sounds: No rales  Abdominal:      Palpations: Abdomen is soft  Musculoskeletal:         General: Tenderness present  No deformity  Cervical back: Neck supple  Comments: Right lumbar tenderness worse with left rotation, flexion, left SB   Skin:     General: Skin is warm and dry  Coloration: Skin is not pale  Neurological:      Mental Status: He is alert  Motor: No weakness  Deep Tendon Reflexes: Reflexes normal    Psychiatric:         Behavior: Behavior normal          Thought Content:  Thought content normal          SLR neg b/l       Cale Fillers, DO

## 2022-10-12 PROBLEM — Z00.00 HEALTHCARE MAINTENANCE: Status: RESOLVED | Noted: 2021-04-29 | Resolved: 2022-10-12

## 2022-10-12 PROBLEM — Z11.59 NEED FOR HEPATITIS C SCREENING TEST: Status: RESOLVED | Noted: 2021-04-29 | Resolved: 2022-10-12

## 2022-10-12 PROBLEM — Z12.5 PROSTATE CANCER SCREENING: Status: RESOLVED | Noted: 2021-04-29 | Resolved: 2022-10-12

## 2022-10-12 PROBLEM — Z13.89 SCREENING FOR CARDIOVASCULAR, RESPIRATORY, AND GENITOURINARY DISEASES: Status: RESOLVED | Noted: 2021-04-29 | Resolved: 2022-10-12

## 2022-10-12 PROBLEM — Z13.6 SCREENING FOR CARDIOVASCULAR, RESPIRATORY, AND GENITOURINARY DISEASES: Status: RESOLVED | Noted: 2021-04-29 | Resolved: 2022-10-12

## 2022-10-12 PROBLEM — Z13.83 SCREENING FOR CARDIOVASCULAR, RESPIRATORY, AND GENITOURINARY DISEASES: Status: RESOLVED | Noted: 2021-04-29 | Resolved: 2022-10-12

## 2022-10-12 PROBLEM — Z13.1 SCREENING FOR DIABETES MELLITUS (DM): Status: RESOLVED | Noted: 2021-04-29 | Resolved: 2022-10-12

## 2022-10-12 PROBLEM — Z12.11 COLON CANCER SCREENING: Status: RESOLVED | Noted: 2021-04-29 | Resolved: 2022-10-12

## 2022-10-12 PROBLEM — Z12.11 ENCOUNTER FOR SCREENING FOR MALIGNANT NEOPLASM OF COLON: Status: RESOLVED | Noted: 2019-05-16 | Resolved: 2022-10-12

## 2023-02-02 ENCOUNTER — OFFICE VISIT (OUTPATIENT)
Dept: DERMATOLOGY | Age: 63
End: 2023-02-02

## 2023-02-02 VITALS — WEIGHT: 181.8 LBS | HEIGHT: 71 IN | TEMPERATURE: 97.5 F | BODY MASS INDEX: 25.45 KG/M2

## 2023-02-02 DIAGNOSIS — L21.9 SEBORRHEIC DERMATITIS: Primary | ICD-10-CM

## 2023-02-02 DIAGNOSIS — I78.1 TELANGIECTASIA: ICD-10-CM

## 2023-02-02 DIAGNOSIS — L57.0 KERATOSIS, ACTINIC: ICD-10-CM

## 2023-02-02 DIAGNOSIS — L72.0 EPIDERMAL CYST: ICD-10-CM

## 2023-02-02 RX ORDER — KETOCONAZOLE 20 MG/ML
SHAMPOO TOPICAL
Qty: 129 ML | Refills: 2 | Status: SHIPPED | OUTPATIENT
Start: 2023-02-02

## 2023-02-02 NOTE — PROGRESS NOTES
[facility] Note     Patient Name: Karlene Lima  Encounter Date: 2/2/23     Have you been cared for by a [place] [id number] Dermatologist in the last 3 years and, if so, which description applies to you? Yes  I have been here within the last 3 years, and my medical history has NOT changed since that time  I am MALE/not capable of bearing children  REVIEW OF SYSTEMS:  Have you recently had or currently have any of the following? · No changes in my recent health  PAST MEDICAL HISTORY:  Have you personally ever had or currently have any of the following? If "YES," then please provide more detail  · No changes in my medical history  FAMILY HISTORY:  Any "first degree relatives" (parent, brother, sister, or child) with the following? • No changes in my family's known health  PATIENT EXPERIENCE:    • Do you want the Dermatologist to perform a COMPLETE skin exam today including a clinical examination under the "bra and underwear" areas? NO  • If necessary, do we have your permission to call and leave a detailed message on your Preferred Phone number that includes your specific medical information? Yes      No Known Allergies   Current Outpatient Medications:   •  cyclobenzaprine (FLEXERIL) 10 mg tablet, Take 1 tablet (10 mg total) by mouth daily at bedtime as needed for muscle spasms, Disp: 30 tablet, Rfl: 0  •  sildenafil (VIAGRA) 100 mg tablet, Take 1 tablet (100 mg total) by mouth daily as needed for erectile dysfunction, Disp: 30 tablet, Rfl: 5          • Whom besides the patient is providing clinical information about today's encounter?   o NO ADDITIONAL HISTORIAN (patient alone provided history)  o Patient here for recheck of scalp  Patient stated spots have been treated in the past with liquid nitrogen  Lump on the right nasal bridge of nose remove by excision many years ago   Red spot on the tip of the nose treated with imiquimod cream in the past  Patient stated he saw no difference with the treatment  Spot has gotten bigger since  Physical Exam and Assessment/Plan by Diagnosis:    SPIDER TELANGIECTASIS ("SPIDER ANGIOMA")    Physical Exam:  • Anatomic Location Affected:  Right tip of nose  • Morphological Description:  Phill of capallaries  • Pertinent Positives:  • Pertinent Negatives: Additional History of Present Condition:  See above    Assessment and Plan:  Based on a thorough discussion of this condition and the management approach to it (including a comprehensive discussion of the known risks, side effects and potential benefits of treatment), the patient (family) agrees to implement the following specific plan:  • Reassured benign  • Can be remove with laser treatment  Spider Telangiectasis  A spider telangiectasis (plural: telangiectases) is composed of dilated blood vessels, and is clinically characterised by its spider-like appearance  It is given that name because it has a central red papule (the body of the 'spider') from which fine red lines (the spider legs) extend radially  An alternative explanation for the name explains that the red lines form a spider-like network  Other names for spider telangiectasis are spider angioma (a misnomer), spider naevus and naevus araneus  A solitary spider telangiectasis is common in children and adults, affecting 10-15% of the population  Although they can affect people of any race, spider telangiectases are more easily seen in fair skin compared to skin of colour  Multiple spider telangiectases arise most frequently in pregnancy, in women taking a combined oral contraceptive pill, in patients with liver disease (particularly, in cirrhosis due to alcohol abuse), and in those with thyrotoxicosis  What causes a spider telangiectasis? Spider telangiectasis is an acquired vascular malformation  It occurs because of the failure of a tiny muscle restricting the size of an arteriole   Increased pulsating flow through the vessel (the central papule) results in the dilatation of distal vessels (the red lines)  Spider telangiectasis may arise spontaneously or may be induced by circulating oestrogen, which is increased in pregnancy, women taking combined oral contraceptives, and in those with liver disease  Various vascular endothelial growth factors may be involved  Spider telangiectases are often located on the face, neck, and upper chest (this has been postulated to relate to the distribution of a large vein draining the heart, the superior vena cava)  Spider telangiectases may also occur on the hands, arms, or other sites  Spider telangiectases vary in size and number, tending to be larger and more numerous in people with severe liver disease when other cutaneous signs of liver disease may be present such as palmar erythema, leukonychia, and jaundice  A central dilated arteriole may be present without radial capillaries, and the capillaries may vary in diameter, length, and number  They can also be star-shaped  The lesions may briefly bleed on trauma but otherwise do not cause any symptoms or complications  A spider telangiectasis is diagnosed by its typical appearance  Compression of the central arteriole results in the disappearance of the radial capillaries, which rapidly refill when the compression is relieved  This is best seen through a transparent object, such as a glass slide or the lens of a contact dermatoscope  Spider telangiectases are distinct from 'spider veins', which are blue-coloured dilated venules arising on the thighs and lower legs and often associated with varicose veins  What is the treatment for a spider telangiectasis? A spider telangiectasis is harmless and does not require treatment  A lesion that is unsightly can be removed by destroying the feeding central arteriole, but this may result in a small scar   Treatments to remove spider telangiectasis include:  • Cryotherapy  • Electrocautery  • Intense pulsed light  • Vascular laser  Surgical excision is rarely necessary and inevitably leaves a scar  Spider telangiectases can persist or disappear  In women, oestrogen-induced telangiectases often disappear within 6 months after having a baby or of stopping the combined contraceptive pill  They can also reappear after initially successful treatment  EPIDERMAL INCLUSION CYST    Physical Exam:  • Anatomic Location Affected:  Right nasal side wall  • Morphological Description:  4 mm white round papule  • Pertinent Positives:  • Pertinent Negatives: Additional History of Present Condition:  See above    Assessment and Plan:  Based on a thorough discussion of this condition and the management approach to it (including a comprehensive discussion of the known risks, side effects and potential benefits of treatment), the patient (family) agrees to implement the following specific plan:  • Schedule 6 mm punch biopsy    What are epidermal inclusion cysts? Epidermal inclusion cysts are the most common, benign cutaneous cysts  There are many different names for epidermal inclusion cysts, including epidermoid cyst, epidermal cyst, infundibular cyst, inclusion cyst, and keratin cyst  These cysts can occur anywhere on the body and typically present as nodules directly underneath the skin  There is often a visible pore or opening in the center  The cysts are freely moveable and can range from a few millimeters to several centimeters in diameter  The center of epidermoid cysts almost always contains keratin, which has a cheesy appearance, and not sebum  They also do not originate from sebaceous glands  Therefore, epidermal inclusion cysts are not the same as sebaceous cysts  Cysts may remain stable or progressively enlarge over time  There are no reliable predictive factors to tell if an epidermal inclusion cyst will enlarge, become inflamed, or remain quiescent  Infected cysts tend to become larger, turn red, and are more noticeable to the patient  There may be accompanying pain and discomfort  What causes epidermal inclusion cysts? Epidermal inclusion cysts often appear out of the blue and are not contagious  They are due to a proliferation of epidermal cells within the dermis and are more common in men than women  They occur more frequently in patients in their 20s to 45s  Epidermal inclusion cysts by themselves are usually not inherited, but they can be hereditary in rare syndromes such as Borden syndrome, nodular elastosis with cysts and comedones (Favre-Racouchot syndrome), and basal cell nevus syndrome (Gorlin syndrome)  Elderly patients with chronic sun-damaged skin areas have a higher likelihood of developing epidermoid cysts  They often occur in areas where hair follicles have been inflamed or repeatedly irritated are more frequent in patients with acne vulgaris  In the  period, they are called milia  Patients on BRAF inhibitors such as imiquimod and cyclosporine have a higher incidence of epidermoid cysts of the face  How do we diagnose an epidermal inclusion cyst?  Epidermoid inclusion cysts are often diagnosed by history and physical exam  There is usually no need for biopsy prior to removal   Radiographic and laboratory exams, such as ultrasound studies, are unnecessary and not typically ordered unless the practitioner suspects a genetic condition  What is the treatment for an epidermal inclusion cyst?  Inflamed, uninfected epidermal inclusion cysts rarely resolve spontaneously without therapy or surgical intervention  Treatment is not emergent unless desired by the patient  Definitive treatment is via surgical excision with walls intact  This method will prevent recurrence  This is best done when the cyst is not inflamed, to decrease the probability of rupture during surgery     - A local anesthetic will be injected around the cyst  - A small incision is made in the skin overlying the cyst, and contents are expressed  - The incision is repaired with sutures    Another option is to use a 4mm punch biopsy with cyst extraction through the defect  Incision and drainage is often needed if the cyst is infected or inflamed  If there is surrounding cellulitis, oral antibiotic therapy may be necessary  The common agents used target methicillin sensitive Staphylococcal aureus and methicillin resistant S aureus in areas of high prevalence  ACTINIC KERATOSIS    Physical Exam:  • Anatomic Location Affected:  scalp  • Morphological Description:  Scaly pink papules    Additional History of Present Condition:  See above    Assessment and Plan:  Based on a thorough discussion of this condition and the management approach to it (including a comprehensive discussion of the known risks, side effects and potential benefits of treatment), the patient (family) agrees to implement the following specific plan:    • Treatment of liquid nitrogen applied to the scalp today's visit  • Signed verbal consent obtained  Actinic keratoses are very common on sites repeatedly exposed to the sun, especially the backs of the hands and the face, most often affecting the ears, nose, cheeks, upper lip, vermilion of the lower lip, temples, forehead and balding scalp  In severely chronically sun-damaged individuals, they may also be found on the upper trunk, upper and lower limbs, and dorsum of feet  We discussed the theoretical premalignant (“pre-cancerous”) nature and etiology of these growths  We discussed the prevailing notion that actinic keratoses are a reflection of abnormal skin cell development due to DNA damage by short wavelength UVB  They are more likely to appear if the immune function is poor, due to aging, recent sun exposure, predisposing disease or certain drugs      We discussed that the main concern is that actinic keratoses may predispose to squamous cell carcinoma  It is rare for a solitary actinic keratosis to evolve to squamous cell carcinoma (SCC), but the risk of SCC occurring at some stage in a patient with more than 10 actinic keratoses is thought to be about 10 to 15%  A tender, thickened, ulcerated or enlarging actinic keratosis is suspicious of SCC  Actinic keratoses may be prevented by strict sun protection  If already present, keratoses may improve with a very high sun protection factor (50+) broad-spectrum sunscreen applied at least daily to affected areas, year-round  We recommend that UPF-rated clothing and hats and sunglasses be worn whenever possible and that a sunscreen-moisturizer combination product such as Neutrogena Daily Defense be applied at least three times a day  We performed a thorough discussion of treatment options and specific risk/benefits/alternatives including but not limited to medical “field” treatment with medications such as the following:    • Topical “field area” medications such as 5-fluorouracil or Aldara (specifically, the trouble with long-term compliance, blistering and local skin reaction versus the convenience of at-home therapy and that field therapy “gets what is not yet seen”)  • Cryotherapy (specifically, local pain, scarring, dyspigmentation, blistering, possible superinfection, and treats “only what we see” versus directed treatment today)  • Photodynamic therapy (specifically, local pain, scarring, dyspigmentation, blistering, possible superinfection, need to schedule for a later date, and time spent in the office versus field therapy that “gets what is not yet seen”)      PROCEDURE:  DESTRUCTION OF PRE-MALIGNANT LESIONS  After a thorough discussion of treatment options and risk/benefits/alternatives (including but not limited to local pain, scarring, dyspigmentation, blistering, and possible superinfection), verbal and written consent were obtained and the aforementioned lesions were treated on with cryotherapy using liquid nitrogen x 1 cycle for 5-10 seconds  • TOTAL NUMBER of 5 pre-malignant lesions were treated today on the ANATOMIC LOCATION: scalp  The patient tolerated the procedure well, and after-care instructions were provided  SEBORRHEIC DERMATITIS    Physical Exam:  • Anatomic Location Affected:  scalp  • Morphological Description:  Pink patches with fine scale  • Pertinent Positives:  • Pertinent Negatives: Additional History of Present Condition:  Noted upon examination    Assessment and Plan:  Based on a thorough discussion of this condition and the management approach to it (including a comprehensive discussion of the known risks, side effects and potential benefits of treatment), the patient (family) agrees to implement the following specific plan:  • Ketoconazole 2% shampoo Daily for 2 weeks straight and then on "Mondays, Wednesdays and Fridays":  Lather into scalp leave on for 5 minutes and then rinse off completely  Seborrheic Dermatitis   Seborrheic dermatitis is a common, chronic or relapsing form of eczema/dermatitis that mainly affects the sebaceous, gland-rich regions of the scalp, face, and trunk  There are infantile and adult forms of seborrhoeic dermatitis  It is sometimes associated with psoriasis and, in that clinical scenario, may be referred to as "sebo-psoriasis "  Seborrheic dermatitis is also known as "seborrheic eczema "  Dandruff (also called "pityriasis capitis") is an uninflamed form of seborrhoeic dermatitis  Dandruff presents as bran-like scaly patches scattered within hair-bearing areas of the scalp  In an infant, this condition may be referred to as "cradle cap "  The cause of seborrheic dermatitis is not completely understood  It is associated with proliferation of various species of the skin commensal Malassezia, in its yeast (non-pathogenic) form   Its metabolites (such as the fatty acids oleic acid, malssezin, and indole-3-carbaldehyde) may cause an inflammatory reaction  Differences in skin barrier lipid content and function may account for individual presentations  Infantile Seborrheic Dermatitis  Infantile seborrheic dermatitis affects babies under the age of 1 months and usually resolves by 1012 months of age  Infantile seborrheic dermatitis causes "cradle cap" (diffuse, greasy scaling on scalp)  The rash may spread to affect armpit and groin folds (a type of "napkin dermatitis")  There may be associated salmon-pink colored patches that may flake or peel  The rash in this case is usually not especially itchy, so the baby often appears undisturbed by the rash, even when more generalized  Adult Seborrheic Dermatitis  Adult seborrheic dermatitis tends to begin in late adolescence; prevalence is greatest in young adults and in the elderly  It is more common in males than in females  The following factors are sometimes associated with severe adult seborrheic dermatitis:  • Oily skin  • Familial tendency to seborrhoeic dermatitis or a family history of psoriasis  • Immunosuppression: organ transplant recipient, human immunodeficiency virus (HIV) infection and patients with lymphoma  • Neurological and psychiatric diseases: Parkinson disease, tardive dyskinesia, depression, epilepsy, facial nerve palsy, spinal cord injury and congenital disorders such as Down syndrome  • Treatment for psoriasis with psoralen and ultraviolet A (PUVA) therapy  • Lack of sleep  • Stressful events  In adults, seborrheic dermatitis may typically affect the scalp, face (creases around the nose, behind ears, within eyebrows) and upper trunk   Typical clinical features include:  • Winter flares, improving in summer following sun exposure  • Minimal itch most of the time  • Combination oily and dry mid-facial skin  • Ill-defined localized scaly patches or diffuse scale in the scalp  • Blepharitis; scaly red eyelid margins  • Mobile-pink, thin, scaly, and ill-defined plaques in skin folds on both sides of the face  • Petal or ring-shaped flaky patches on hair-line and on anterior chest  • Rash in armpits, under the breasts, in the groin folds and genital creases  • Superficial folliculitis (inflamed hair follicles) on cheeks and upper trunk    Seborrheic dermatitis is diagnosed by its clinical appearance and behavior  Skin biopsy may be helpful but is rarely necessary to make this diagnosis      Scribe Attestation    I,:  Dede Dickey am acting as a scribe while in the presence of the attending physician :       I,:  Tracey Meadows MD personally performed the services described in this documentation    as scribed in my presence :

## 2023-02-03 ENCOUNTER — TELEPHONE (OUTPATIENT)
Dept: FAMILY MEDICINE CLINIC | Facility: CLINIC | Age: 63
End: 2023-02-03

## 2023-02-03 NOTE — TELEPHONE ENCOUNTER
Tania Masters is calling stating that he's going to [facility] and needs malaria pills and altitude sickness pills prescribed  Is this something Dr Montoya Elsah can prescribe? Does patient need an apt?     Please call patient back     Thank you

## 2023-02-03 NOTE — TELEPHONE ENCOUNTER
Would you like the patient to be seen in the office, or should he see a travel doctor for this?   Brandon Mariscal, STORMYN

## 2023-02-03 NOTE — TELEPHONE ENCOUNTER
I called him x 2 but no answer  Per CDC/malaria website, depending on the area in [facility], Foz [place] can be prescribed  Also Diamox can be prescribed for acute altitude sickness, 500mg bid for 2d before ascent and for 2d at altitude

## 2023-02-10 DIAGNOSIS — T70.29XA ACUTE MOUNTAIN SICKNESS: Primary | ICD-10-CM

## 2023-02-10 DIAGNOSIS — D75.1 ACUTE MOUNTAIN SICKNESS: Primary | ICD-10-CM

## 2023-02-10 DIAGNOSIS — R69 TRAVEL-RELATED ILLNESS: Primary | ICD-10-CM

## 2023-02-10 DIAGNOSIS — R69 TRAVEL-RELATED ILLNESS: ICD-10-CM

## 2023-02-10 RX ORDER — ACETAZOLAMIDE 250 MG/1
TABLET ORAL
Qty: 16 TABLET | Refills: 0 | Status: SHIPPED | OUTPATIENT
Start: 2023-02-10

## 2023-02-10 RX ORDER — ATOVAQUONE AND PROGUANIL HYDROCHLORIDE 250; 100 MG/1; MG/1
TABLET, FILM COATED ORAL
Qty: 20 TABLET | Refills: 0 | Status: SHIPPED | OUTPATIENT
Start: 2023-02-10 | End: 2023-03-10

## 2023-02-10 RX ORDER — CIPROFLOXACIN 500 MG/1
500 TABLET, FILM COATED ORAL EVERY 12 HOURS SCHEDULED
Qty: 6 TABLET | Refills: 0 | Status: SHIPPED | OUTPATIENT
Start: 2023-02-10 | End: 2023-02-13

## 2023-02-10 NOTE — TELEPHONE ENCOUNTER
S/w pt about their trip to [facility]  [place]      Reviewed CDC guidance  Sent rx malarone, diamox, cipro and aware of instructions and warnings

## 2023-02-10 NOTE — PROGRESS NOTES
S/w pt about their trip to [facility]  [place] local [place]      Reviewed CDC guidance  Sent rx malarone, diamox, cipro and aware of instructions and warnings

## 2023-04-26 ENCOUNTER — PROCEDURE VISIT (OUTPATIENT)
Dept: DERMATOLOGY | Age: 63
End: 2023-04-26

## 2023-04-26 VITALS — WEIGHT: 176.2 LBS | TEMPERATURE: 97.4 F | BODY MASS INDEX: 24.67 KG/M2 | HEIGHT: 71 IN

## 2023-04-26 DIAGNOSIS — L72.0 EPIDERMAL CYST: Primary | ICD-10-CM

## 2023-04-26 NOTE — PATIENT INSTRUCTIONS
"  INFORMED CONSENT DISCUSSION AND POST-OPERATIVE INSTRUCTIONS FOR PATIENT      III  POST-PROCEDURAL CARE (WHAT YOU WILL NEED TO DO \"AFTER THE BIOPSY\" TO OPTIMIZE HEALING)    Keep the area clean and dry  Try NOT to remove the bandage or get it wet for the first 24 hours  Gently clean the area and apply surgical ointment (such as Vaseline petrolatum ointment, which is available \"over the counter\" and not a prescription) to the biopsy site for up to 1 week straight  This acts to protect the wound from the outside world  return for suture removal as instructed (generally 1 week for the face)  Take Acetaminophen (Tylenol) for discomfort, if no contraindications  Ibuprofen or aspirin could make bleeding worse  Call our office immediately for signs of infection: fever, chills, increased redness, warmth, tenderness, discomfort/pain, or pus or foul smell coming from the wound  WHAT TO DO IF THERE IS ANY BLEEDING? If a small amount of bleeding is noticed, place a clean cloth over the area and apply firm pressure for ten minutes  Check the wound after 10 minutes of direct pressure  If bleeding persists, try one more time for an additional 10 minutes of direct pressure on the area  If the bleeding becomes heavier or does not stop after the second attempt, or if you have any other questions about this procedure, then please call your [facility] Dermatologist by calling [phone] (SKIN)  I hereby acknowledge that I have reviewed and verified the site with my Dermatologist and have requested and authorized my Dermatologist to proceed with the procedure           "

## 2023-04-26 NOTE — PROGRESS NOTES
PROCEDURE NOTE:  PUNCH BIOPSY      Performing Physician: Nu Orta    Anatomic Location; Clinical Description with size (cm); Pre-Op Diagnosis:   •  Right Nasal sidewall; 0 4 cm white round papule; rule out Epidermal inclusion cyst       Anesthesia: 2% Xylocaine with epi      Topical anesthesia: None       Indications: To indicate diagnosis and management plan  Procedure Details     Patient informed of the risks (including bleeding,scaring and infection) and benefits of the procedure explained  Verbal and written informed consent obtained  The area was prepped and draped in the usual fashion  Anesthesia was obtained with 1% lidocaine with epinephrine  The skin was then stretched perpendicular to the skin tension lines and a punch biopsy to an appropriate sampling depth was obtained with a 6 mm punch with a forceps and iris scissors  Hemostasis was obtained with 4-0 Ethilon x 3 sutures  Complications:  None      Specimen has been sent for review by Dermatopathology  Plan:  1  Instructed to keep the wound dry and covered for 24-48h and clean thereafter  2  Warning signs of infection were reviewed  3  Recommended that the patient use acetaminophen as needed for pain  4  Sutures if any should be removed in 7 days      Standard post-procedure care has been explained and has been included in written form within the patient's copy of Informed Consent        Scribe Attestation    I,:  Yolanda Correia am acting as a scribe while in the presence of the attending physician :       I,:  Jazzmine Bryant MD personally performed the services described in this documentation    as scribed in my presence :

## 2023-05-02 NOTE — RESULT ENCOUNTER NOTE
Tissue Exam: [id number]  Order: [id number]  Status: Final result     Visible to patient: Yes (seen)     Dx: Epidermal cyst     0 Result Notes  Component   Case Report  Surgical Pathology Report                         Case: [id number]                                   Authorizing Provider: Laura Flynn MD     Collected:           04/26/2023 [facility]              Ordering Location:     [facility]      Received:            04/26/2023 [address] [place] [address]                                                                   Pathologist:           Abraham Vega MD                                                           Specimen:    Skin, Other, A: Right Nasal Sidewall                                                     Final Diagnosis  A  Skin, right nasal sidewall, punch biopsy:     Consistent with DERMAL LIPOMA (see note)      Note: If the lesion were to progress/persist, additional sampling should be sought  Electronically signed by Abraham Vega MD on 5/1/2023 at  2:57 PM      DERMATOPATHOLOGY RESULT NOTE    Results reviewed by ordering physician  Called patient to personally discuss results  No answer, left voicemail with result        Instructions for Clinical Derm Team:   (remember to route Result Note to appropriate staff):    None    Result & Plan by Specimen:    Specimen A: benign  Plan: reassured, benign

## 2023-05-03 ENCOUNTER — OFFICE VISIT (OUTPATIENT)
Age: 63
End: 2023-05-03

## 2023-05-03 DIAGNOSIS — Z48.02 ENCOUNTER FOR REMOVAL OF SUTURES: Primary | ICD-10-CM

## 2023-05-03 NOTE — PROGRESS NOTES
Suture removal    Date/Time: 5/3/2023 10:30 AM  Performed by: Ashwin Mehta  Authorized by: Concepcion Nash MD   Universal Protocol:  Consent: Verbal consent obtained  Consent given by: patient  Timeout called at: 5/3/2023 10:55 AM   Patient understanding: patient states understanding of the procedure being performed  Patient consent: the patient's understanding of the procedure matches consent given  Procedure consent: procedure consent matches procedure scheduled  Patient identity confirmed: verbally with patient        Patient location:  Clinic  Location:     Laterality:  Right    Location:  [address] location: right nasal sidewall   Procedure details: Tools used:  Suture removal kit    Wound appearance:  No sign(s) of infection, good wound healing and clean    Number of sutures removed:  3  Post-procedure details:     Patient tolerance of procedure:   Tolerated well, no immediate complications  Comments:      Vaseline and Bandage applied for pinpoint bleeding

## 2023-06-26 ENCOUNTER — TELEPHONE (OUTPATIENT)
Dept: FAMILY MEDICINE CLINIC | Facility: CLINIC | Age: 63
End: 2023-06-26

## 2023-06-26 NOTE — TELEPHONE ENCOUNTER
Patient called and scheduled an appointment for a physical on 8/8. He would like to go for blood work prior. Please put order in for [facility] and let patient know once done.

## 2023-06-29 DIAGNOSIS — Z12.5 PROSTATE CANCER SCREENING: ICD-10-CM

## 2023-06-29 DIAGNOSIS — Z13.1 SCREENING FOR DIABETES MELLITUS (DM): ICD-10-CM

## 2023-06-29 DIAGNOSIS — Z13.83 SCREENING FOR CARDIOVASCULAR, RESPIRATORY, AND GENITOURINARY DISEASES: Primary | ICD-10-CM

## 2023-06-29 DIAGNOSIS — Z13.89 SCREENING FOR CARDIOVASCULAR, RESPIRATORY, AND GENITOURINARY DISEASES: Primary | ICD-10-CM

## 2023-06-29 DIAGNOSIS — Z13.6 SCREENING FOR CARDIOVASCULAR, RESPIRATORY, AND GENITOURINARY DISEASES: Primary | ICD-10-CM

## 2023-07-26 LAB
ALBUMIN SERPL-MCNC: 4.6 G/DL (ref 3.9–4.9)
ALBUMIN/GLOB SERPL: 2.2 {RATIO} (ref 1.2–2.2)
ALP SERPL-CCNC: 63 IU/L (ref 44–121)
ALT SERPL-CCNC: 11 IU/L (ref 0–44)
AST SERPL-CCNC: 22 IU/L (ref 0–40)
BILIRUB SERPL-MCNC: 0.5 MG/DL (ref 0–1.2)
BUN SERPL-MCNC: 12 MG/DL (ref 8–27)
BUN/CREAT SERPL: 14 (ref 10–24)
CALCIUM SERPL-MCNC: 9.3 MG/DL (ref 8.6–10.2)
CHLORIDE SERPL-SCNC: 103 MMOL/L (ref 96–106)
CHOLEST SERPL-MCNC: 190 MG/DL (ref 100–199)
CO2 SERPL-SCNC: 24 MMOL/L (ref 20–29)
CREAT SERPL-MCNC: 0.86 MG/DL (ref 0.76–1.27)
EGFR: 97 ML/MIN/1.73
GLOBULIN SER-MCNC: 2.1 G/DL (ref 1.5–4.5)
GLUCOSE SERPL-MCNC: 86 MG/DL (ref 70–99)
HDLC SERPL-MCNC: 93 MG/DL
LDLC SERPL CALC-MCNC: 87 MG/DL (ref 0–99)
LDLC/HDLC SERPL: 0.9 RATIO (ref 0–3.6)
MICRODELETION SYND BLD/T FISH: NORMAL
POTASSIUM SERPL-SCNC: 4.5 MMOL/L (ref 3.5–5.2)
PROT SERPL-MCNC: 6.7 G/DL (ref 6–8.5)
PSA SERPL DL<=0.01 NG/ML-MCNC: 1.5 NG/ML (ref 0–4)
SL AMB VLDL CHOLESTEROL CALC: 10 MG/DL (ref 5–40)
SODIUM SERPL-SCNC: 140 MMOL/L (ref 134–144)
TRIGL SERPL-MCNC: 49 MG/DL (ref 0–149)

## 2023-08-08 ENCOUNTER — OFFICE VISIT (OUTPATIENT)
Dept: FAMILY MEDICINE CLINIC | Facility: CLINIC | Age: 63
End: 2023-08-08
Payer: COMMERCIAL

## 2023-08-08 VITALS
DIASTOLIC BLOOD PRESSURE: 80 MMHG | HEIGHT: 71 IN | WEIGHT: 171.4 LBS | BODY MASS INDEX: 24 KG/M2 | TEMPERATURE: 97.9 F | RESPIRATION RATE: 17 BRPM | SYSTOLIC BLOOD PRESSURE: 128 MMHG | OXYGEN SATURATION: 98 % | HEART RATE: 76 BPM

## 2023-08-08 DIAGNOSIS — M54.50 ACUTE RIGHT-SIDED LOW BACK PAIN WITHOUT SCIATICA: ICD-10-CM

## 2023-08-08 DIAGNOSIS — Z00.00 HEALTHCARE MAINTENANCE: Primary | ICD-10-CM

## 2023-08-08 DIAGNOSIS — N52.9 ORGANIC IMPOTENCE: ICD-10-CM

## 2023-08-08 PROCEDURE — 99396 PREV VISIT EST AGE 40-64: CPT | Performed by: FAMILY MEDICINE

## 2023-08-08 RX ORDER — SILDENAFIL 100 MG/1
100 TABLET, FILM COATED ORAL DAILY PRN
Qty: 30 TABLET | Refills: 5 | Status: SHIPPED | OUTPATIENT
Start: 2023-08-08

## 2023-08-08 RX ORDER — CYCLOBENZAPRINE HCL 10 MG
10 TABLET ORAL
Qty: 30 TABLET | Refills: 2 | Status: SHIPPED | OUTPATIENT
Start: 2023-08-08

## 2023-08-08 NOTE — PROGRESS NOTES
Assessment/Plan:    No problem-specific Assessment & Plan notes found for this encounter. cpe    Can call for PT order if needed for back, flexeril sedation risk aware, nsaids with food short term, ice, HEP  Trial flexeril with nsaids  Sedation risk advised    Labs reviewed  fram score 7.1%    viagra refill given     Diagnoses and all orders for this visit:    Healthcare maintenance    Organic impotence  -     sildenafil (VIAGRA) 100 mg tablet; Take 1 tablet (100 mg total) by mouth daily as needed for erectile dysfunction    Acute right-sided low back pain without sciatica  -     cyclobenzaprine (FLEXERIL) 10 mg tablet; Take 1 tablet (10 mg total) by mouth daily at bedtime as needed for muscle spasms        Return if symptoms worsen or fail to improve. Subjective:      Patient ID: Patricia Toure is a 60 y.o. male.     Chief Complaint   Patient presents with   • Back Pain     Patient injured back on Sunday - was lifting heavy couch     • Physical Exam     Alyssa Torres CMA        HPI    Here for cpe    Helped in laws move  Power Boop was suprisingly heavy, was a sleeper sofa  Immediate right low back  3rd day  Worse over next few days  Took motrin  Heating pad  Some ice  No radiation or incontinence  Not sleeping well from it    Has salads  Not much red meat  Fish/chicken often  Mostly water  40 oz/d    Exercises  Gym 3x/w  Lately mostly cardio    The 10-year ASCVD risk score (Roberta RIVERA, et al., 2019) is: 7.1%    Values used to calculate the score:      Age: 60 years      Sex: Male      Is Non- : No      Diabetic: No      Tobacco smoker: No      Systolic Blood Pressure: 179 mmHg      Is BP treated: No      HDL Cholesterol: 93 mg/dL      Total Cholesterol: 190 mg/dL    The following portions of the patient's history were reviewed and updated as appropriate: allergies, current medications, past family history, past medical history, past social history, past surgical history and problem list.    Review of Systems   Genitourinary: Negative for difficulty urinating. Musculoskeletal: Positive for back pain. Current Outpatient Medications   Medication Sig Dispense Refill   • cyclobenzaprine (FLEXERIL) 10 mg tablet Take 1 tablet (10 mg total) by mouth daily at bedtime as needed for muscle spasms 30 tablet 2   • sildenafil (VIAGRA) 100 mg tablet Take 1 tablet (100 mg total) by mouth daily as needed for erectile dysfunction 30 tablet 5     No current facility-administered medications for this visit. Objective:    /80   Pulse 76   Temp 97.9 °F (36.6 °C)   Resp 17   Ht 5' 11" (1.803 m)   Wt 77.7 kg (171 lb 6.4 oz)   SpO2 98%   BMI 23.91 kg/m²        Physical Exam  Vitals and nursing note reviewed. Constitutional:       General: He is not in acute distress. Appearance: He is well-developed. He is not ill-appearing. HENT:      Head: Normocephalic. Right Ear: Tympanic membrane normal. There is no impacted cerumen. Left Ear: Tympanic membrane normal. There is no impacted cerumen. Nose: No congestion. Eyes:      General: No scleral icterus. Conjunctiva/sclera: Conjunctivae normal.   Neck:      Vascular: No carotid bruit. Cardiovascular:      Rate and Rhythm: Normal rate and regular rhythm. Heart sounds: No murmur heard. Pulmonary:      Effort: Pulmonary effort is normal. No respiratory distress. Breath sounds: No wheezing. Abdominal:      General: There is no distension. Palpations: Abdomen is soft. Tenderness: There is no abdominal tenderness. Hernia: No hernia is present. Genitourinary:     Penis: Normal.       Testes: Normal.      Prostate: Normal.      Rectum: Normal.   Musculoskeletal:         General: No deformity. Cervical back: Neck supple. Right lower leg: No edema. Left lower leg: No edema. Comments: SLR neg b/l  paralumbar tenderness on right   Skin:     General: Skin is warm and dry. Coloration: Skin is not pale. Neurological:      Mental Status: He is alert. Motor: No weakness. Gait: Gait normal.   Psychiatric:         Mood and Affect: Mood normal.         Behavior: Behavior normal.         Thought Content:  Thought content normal.                Emily Apt, DO

## 2023-10-07 PROBLEM — Z00.00 HEALTHCARE MAINTENANCE: Status: RESOLVED | Noted: 2023-08-08 | Resolved: 2023-10-07

## 2024-01-15 ENCOUNTER — TELEPHONE (OUTPATIENT)
Dept: DERMATOLOGY | Facility: CLINIC | Age: 64
End: 2024-01-15

## 2024-01-15 NOTE — TELEPHONE ENCOUNTER
Called  number on file no answer. Appt rescheduled from 1/29 to 2/21 with Dr. Page . Advised he call back to change if date and time does not work for him

## 2024-02-14 ENCOUNTER — TELEPHONE (OUTPATIENT)
Age: 64
End: 2024-02-14

## 2024-02-14 NOTE — TELEPHONE ENCOUNTER
Called pt to r/s his appt for Dr. Bosch. I offered appt with one of our PA at [place]. Pt was okay to see the PA but asked if they would removed the issues. I explained that I'm not clinical and I think they would look at the issues and disuses what the best options would be. After explaining this he decided to look elsewhere.

## 2024-07-04 DIAGNOSIS — Z00.6 ENCOUNTER FOR EXAMINATION FOR NORMAL COMPARISON OR CONTROL IN CLINICAL RESEARCH PROGRAM: ICD-10-CM

## 2024-07-22 ENCOUNTER — OFFICE VISIT (OUTPATIENT)
Dept: FAMILY MEDICINE CLINIC | Facility: CLINIC | Age: 64
End: 2024-07-22
Payer: COMMERCIAL

## 2024-07-22 ENCOUNTER — APPOINTMENT (OUTPATIENT)
Dept: RADIOLOGY | Facility: CLINIC | Age: 64
End: 2024-07-22
Payer: COMMERCIAL

## 2024-07-22 VITALS
DIASTOLIC BLOOD PRESSURE: 80 MMHG | HEIGHT: 71 IN | BODY MASS INDEX: 24.53 KG/M2 | RESPIRATION RATE: 16 BRPM | HEART RATE: 80 BPM | SYSTOLIC BLOOD PRESSURE: 110 MMHG | WEIGHT: 175.2 LBS | TEMPERATURE: 97.4 F

## 2024-07-22 DIAGNOSIS — M25.561 ACUTE PAIN OF RIGHT KNEE: ICD-10-CM

## 2024-07-22 DIAGNOSIS — M25.561 ACUTE PAIN OF RIGHT KNEE: Primary | ICD-10-CM
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
## 2024-07-22 PROCEDURE — 99213 OFFICE O/P EST LOW 20 MIN: CPT | Performed by: FAMILY MEDICINE

## 2024-07-22 PROCEDURE — 73562 X-RAY EXAM OF KNEE 3: CPT

## 2024-07-22 NOTE — PROGRESS NOTES
"Assessment/Plan:    1. Acute pain of right knee  -     XR knee 3 vw right non injury; Future; Expected date: 07/22/2024  -     Lyme Total AB W Reflex to IGM/IGG; Future  -     Lyme Total AB W Reflex to IGM/IGG  2. Tick bite, unspecified site, initial encounter  -     Lyme Total AB W Reflex to IGM/IGG; Future  -     Lyme Total AB W Reflex to IGM/IGG      Creps on rom testing  No joint line tenderness or swelling at this time    There are no Patient Instructions on file for this visit.    No follow-ups on file.    Subjective:      Patient ID: Ata Harry is a 63 y.o. male.    Chief Complaint   Patient presents with   • Knee Pain     Stiff and pain right knee; had tick bite around the same time symptoms started   YC       Pt states three weeks ago issues with rt knee.  States it was initially stiff.  Tight no pain.  1.5 weeks ago he started getting pain on lat aspect of the rt knee  Issues with stirs  He rested it since then  Started to improve  Today is much better.      Pt states about a month ago pt was bitten by a tick        The following portions of the patient's history were reviewed and updated as appropriate: allergies, current medications, past family history, past medical history, past social history, past surgical history and problem list.    Review of Systems   Musculoskeletal:  Positive for arthralgias.         Current Outpatient Medications   Medication Sig Dispense Refill   • cyclobenzaprine (FLEXERIL) 10 mg tablet Take 1 tablet (10 mg total) by mouth daily at bedtime as needed for muscle spasms 30 tablet 2   • sildenafil (VIAGRA) 100 mg tablet Take 1 tablet (100 mg total) by mouth daily as needed for erectile dysfunction 30 tablet 5     No current facility-administered medications for this visit.       Objective:    /80   Pulse 80   Temp (!) 97.4 °F (36.3 °C) (Tympanic)   Resp 16   Ht 5' 11\" (1.803 m)   Wt 79.5 kg (175 lb 3.2 oz)   BMI 24.44 kg/m²        Physical " Exam  Musculoskeletal:      Comments: Creps on rom testing of the rt knee  Neg joint line tenderness  No laxity                Frank Lombardi, DO

## 2024-07-24 LAB — B BURGDOR IGG+IGM SER QL IA: NEGATIVE

## 2025-03-03 DIAGNOSIS — Z13.89 SCREENING FOR CARDIOVASCULAR, RESPIRATORY, AND GENITOURINARY DISEASES: ICD-10-CM

## 2025-03-03 DIAGNOSIS — Z13.83 SCREENING FOR CARDIOVASCULAR, RESPIRATORY, AND GENITOURINARY DISEASES: ICD-10-CM

## 2025-03-03 DIAGNOSIS — Z13.1 SCREENING FOR DIABETES MELLITUS (DM): ICD-10-CM

## 2025-03-03 DIAGNOSIS — Z12.5 ENCOUNTER FOR SCREENING FOR MALIGNANT NEOPLASM OF PROSTATE: ICD-10-CM

## 2025-03-03 DIAGNOSIS — Z13.6 SCREENING FOR CARDIOVASCULAR, RESPIRATORY, AND GENITOURINARY DISEASES: ICD-10-CM

## 2025-06-06 LAB
ALBUMIN SERPL-MCNC: 4.4 G/DL (ref 3.9–4.9)
ALP SERPL-CCNC: 67 IU/L (ref 44–121)
ALT SERPL-CCNC: 12 IU/L (ref 0–44)
AST SERPL-CCNC: 23 IU/L (ref 0–40)
BILIRUB SERPL-MCNC: 0.2 MG/DL (ref 0–1.2)
BUN SERPL-MCNC: 13 MG/DL (ref 8–27)
BUN/CREAT SERPL: 15 (ref 10–24)
CALCIUM SERPL-MCNC: 9.8 MG/DL (ref 8.6–10.2)
CHLORIDE SERPL-SCNC: 102 MMOL/L (ref 96–106)
CHOLEST SERPL-MCNC: 193 MG/DL (ref 100–199)
CO2 SERPL-SCNC: 24 MMOL/L (ref 20–29)
CREAT SERPL-MCNC: 0.87 MG/DL (ref 0.76–1.27)
EGFR: 96 ML/MIN/1.73
GLOBULIN SER-MCNC: 2.1 G/DL (ref 1.5–4.5)
GLUCOSE SERPL-MCNC: 92 MG/DL (ref 70–99)
HDLC SERPL-MCNC: 103 MG/DL
LDLC SERPL CALC-MCNC: 81 MG/DL (ref 0–99)
LDLC/HDLC SERPL: 0.8 RATIO (ref 0–3.6)
MICRODELETION SYND BLD/T FISH: NORMAL
POTASSIUM SERPL-SCNC: 5.6 MMOL/L (ref 3.5–5.2)
PROT SERPL-MCNC: 6.5 G/DL (ref 6–8.5)
PSA SERPL-MCNC: 1.2 NG/ML (ref 0–4)
SL AMB REFLEX CRITERIA: NORMAL
SL AMB VLDL CHOLESTEROL CALC: 9 MG/DL (ref 5–40)
SODIUM SERPL-SCNC: 139 MMOL/L (ref 134–144)
TRIGL SERPL-MCNC: 44 MG/DL (ref 0–149)

## 2025-06-07 ENCOUNTER — RESULTS FOLLOW-UP (OUTPATIENT)
Dept: FAMILY MEDICINE CLINIC | Facility: CLINIC | Age: 65
End: 2025-06-07

## 2025-07-29 ENCOUNTER — OFFICE VISIT (OUTPATIENT)
Dept: FAMILY MEDICINE CLINIC | Facility: CLINIC | Age: 65
End: 2025-07-29
Payer: MEDICARE

## 2025-07-29 VITALS
SYSTOLIC BLOOD PRESSURE: 120 MMHG | RESPIRATION RATE: 10 BRPM | DIASTOLIC BLOOD PRESSURE: 82 MMHG | OXYGEN SATURATION: 99 % | WEIGHT: 174 LBS | HEIGHT: 71 IN | BODY MASS INDEX: 24.36 KG/M2 | HEART RATE: 70 BPM | TEMPERATURE: 97.2 F

## 2025-07-29 DIAGNOSIS — Z13.6 SCREENING FOR CARDIOVASCULAR CONDITION: ICD-10-CM

## 2025-07-29 DIAGNOSIS — E87.5 HIGH POTASSIUM: ICD-10-CM

## 2025-07-29 DIAGNOSIS — N52.9 ORGANIC IMPOTENCE: ICD-10-CM

## 2025-07-29 DIAGNOSIS — Z00.00 WELCOME TO MEDICARE PREVENTIVE VISIT: Primary | ICD-10-CM

## 2025-07-29 PROCEDURE — G0402 INITIAL PREVENTIVE EXAM: HCPCS | Performed by: FAMILY MEDICINE

## 2025-07-29 RX ORDER — SILDENAFIL 100 MG/1
100 TABLET, FILM COATED ORAL DAILY PRN
Qty: 30 TABLET | Refills: 5 | Status: SHIPPED | OUTPATIENT
Start: 2025-07-29

## 2025-08-05 ENCOUNTER — HOSPITAL ENCOUNTER (OUTPATIENT)
Dept: RADIOLOGY | Facility: HOSPITAL | Age: 65
Discharge: HOME | End: 2025-08-05
Attending: FAMILY MEDICINE
Payer: MEDICARE

## 2025-08-05 DIAGNOSIS — Z13.6 SCREENING FOR CARDIOVASCULAR CONDITION: ICD-10-CM

## 2025-08-05 LAB
BUN SERPL-MCNC: 14 MG/DL (ref 8–27)
BUN/CREAT SERPL: 15 (ref 10–24)
CALCIUM SERPL-MCNC: 9.4 MG/DL (ref 8.6–10.2)
CHLORIDE SERPL-SCNC: 100 MMOL/L (ref 96–106)
CO2 SERPL-SCNC: 21 MMOL/L (ref 20–29)
CREAT SERPL-MCNC: 0.94 MG/DL (ref 0.76–1.27)
EGFR: 90 ML/MIN/1.73
GLUCOSE SERPL-MCNC: 104 MG/DL (ref 70–99)
POTASSIUM SERPL-SCNC: 4.4 MMOL/L (ref 3.5–5.2)
SODIUM SERPL-SCNC: 138 MMOL/L (ref 134–144)

## 2025-08-05 PROCEDURE — 76706 US ABDL AORTA SCREEN AAA: CPT

## 2025-08-14 PROBLEM — I77.811 ECTATIC ABDOMINAL AORTA (HCC): Status: ACTIVE | Noted: 2025-08-14
